# Patient Record
Sex: MALE | Race: WHITE | Employment: UNEMPLOYED | URBAN - METROPOLITAN AREA
[De-identification: names, ages, dates, MRNs, and addresses within clinical notes are randomized per-mention and may not be internally consistent; named-entity substitution may affect disease eponyms.]

---
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
## 2023-08-02 ENCOUNTER — TELEPHONE (OUTPATIENT)
Dept: FAMILY MEDICINE | Facility: CLINIC | Age: 18
End: 2023-08-02

## 2023-08-02 NOTE — TELEPHONE ENCOUNTER
The mother calls to ask for labs to be placed to check his blood because he is a vegetarian. This was discussed at the last visit but no labs were placed. The last time the patient had blood work he passed out and ended up in the ER and then to see cardiology.  The mother would like labs placed.  No consent has been signed please contact the patient after the provider has reviewed the information.        Thank you    Ale MARTÍNEZ RN        (An encounter was started in the wrong patient-  this is the information from the other chart-  about 6 months ago tried to do a lab draw to check levels since he is a vegetarian. He passed out during that appt and couldn't wake up for 4hrs, ended up in the ED and heart clinic while drawing blood. So pt's mom is wanting to make sure that all bloodwork is included to check his levels so this doesn't happen again. Mentioned for sure iron and thyroid need to be added but if anything else is needed would like to include it. Last visit might've been with Ale Nova 11/25/23, Pt's mom is unsure.)

## 2023-08-17 NOTE — PROGRESS NOTES
Raji Curtis is on our lab schedule for Monday 08/21. He doesn't have any orders. I reached out to Jessica Angel. She said you were the last to see him in 2022. Please advise.  Thank you,  [facility]

## 2023-08-17 NOTE — PROGRESS NOTES
Patient needs to be seen by a provider  His mother called and requested labs on a telephone call - see note dated 7/8/23  He last saw Ale Nova on 11/25/2022  I haven't seen patient since 7/2020    Jessica Angel, CNP

## 2023-08-17 NOTE — PROGRESS NOTES
I  called the patient and told him he would need to be seen by  in office before she can order any lab work.

## 2023-08-17 NOTE — PROGRESS NOTES
Raji Curtis has a lab appointment with us 08/21. There isn't any orders for him. His appointment notes say for Iron and Thyroid labs. Please advise.  Thank you,  [facility]

## 2023-08-21 ENCOUNTER — VIRTUAL VISIT (OUTPATIENT)
Dept: FAMILY MEDICINE | Facility: CLINIC | Age: 18
End: 2023-08-21
Payer: COMMERCIAL

## 2023-08-21 DIAGNOSIS — Z78.9 VEGETARIAN: Primary | ICD-10-CM

## 2023-08-21 DIAGNOSIS — I47.10 SVT (SUPRAVENTRICULAR TACHYCARDIA) (H): ICD-10-CM

## 2023-08-21 PROCEDURE — 99213 OFFICE O/P EST LOW 20 MIN: CPT | Mod: VID | Performed by: NURSE PRACTITIONER

## 2023-08-21 NOTE — PROGRESS NOTES
Raji is a 18 year old who is being evaluated via a billable video visit.      How would you like to obtain your AVS? Mail a copy  If the video visit is dropped, the invitation should be resent by: Send to e-mail at: [email or web address]  Will anyone else be joining your video visit? No          Assessment & Plan     Vegetarian  Labs ordered:  - Folate; Future  - Vitamin B12; Future  - Iron; Future    SVT (supraventricular tachycardia) (H)  Known issue that I take into account for their medical decisions, no current exacerbations or new concerns      The risks, benefits and treatment options of prescribed medications or other treatments have been discussed with the patient. The patient verbalized their understanding and should call or follow up if no improvement or if they develop further problems.  KEVIN Perdomo [facility]   Raji is a 18 year old, presenting for the following health issues:  No chief complaint on file.        8/21/2023    11:09 AM   Additional Questions   Roomed by Gina Hopper CMA       HPI     Chief Complaint   Patient presents with    wanting labs     He is wanting labs done. He did not tell me which labs but he was told he needed a visit before getting these done.    Patient is a vegetarian - wants to make sure he isn't deficient in anything.            Review of Systems   Constitutional, HEENT, cardiovascular, pulmonary, gi and gu systems are negative, except as otherwise noted.      Objective           Vitals:  No vitals were obtained today due to virtual visit.    Physical Exam   GENERAL: Healthy, alert and no distress  EYES: Eyes grossly normal to inspection.  No discharge or erythema, or obvious scleral/conjunctival abnormalities.  RESP: No audible wheeze, cough, or visible cyanosis.  No visible retractions or increased work of breathing.    SKIN: Visible skin clear. No significant rash, abnormal pigmentation or lesions.  NEURO:  Cranial nerves grossly intact.  Mentation and speech appropriate for age.  PSYCH: Mentation appears normal, affect normal/bright, judgement and insight intact, normal speech and appearance well-groomed.                Video-Visit Details    Type of service:  Video Visit     Originating Location (pt. Location): Home    Distant Location (provider location):  On-site  Platform used for Video Visit: Mariely

## 2023-08-23 ENCOUNTER — LAB (OUTPATIENT)
Dept: LAB | Facility: CLINIC | Age: 18
End: 2023-08-23
Payer: COMMERCIAL

## 2023-08-23 DIAGNOSIS — Z78.9 VEGETARIAN: ICD-10-CM

## 2023-08-23 LAB
FOLATE SERPL-MCNC: 17.6 NG/ML (ref 4.6–34.8)
IRON SERPL-MCNC: 159 UG/DL (ref 61–157)
VIT B12 SERPL-MCNC: 1027 PG/ML (ref 232–1245)

## 2023-08-23 PROCEDURE — 82746 ASSAY OF FOLIC ACID SERUM: CPT

## 2023-08-23 PROCEDURE — 82607 VITAMIN B-12: CPT

## 2023-08-23 PROCEDURE — 36415 COLL VENOUS BLD VENIPUNCTURE: CPT

## 2023-08-23 PROCEDURE — 83540 ASSAY OF IRON: CPT

## 2023-10-26 ENCOUNTER — PATIENT OUTREACH (OUTPATIENT)
Dept: CARE COORDINATION | Facility: CLINIC | Age: 18
End: 2023-10-26
Payer: COMMERCIAL

## 2023-11-09 ENCOUNTER — PATIENT OUTREACH (OUTPATIENT)
Dept: CARE COORDINATION | Facility: CLINIC | Age: 18
End: 2023-11-09
Payer: COMMERCIAL

## 2024-02-03 ENCOUNTER — HEALTH MAINTENANCE LETTER (OUTPATIENT)
Age: 19
End: 2024-02-03

## 2024-05-23 ENCOUNTER — PATIENT OUTREACH (OUTPATIENT)
Dept: CARE COORDINATION | Facility: CLINIC | Age: 19
End: 2024-05-23
Payer: COMMERCIAL

## 2025-03-02 ENCOUNTER — HEALTH MAINTENANCE LETTER (OUTPATIENT)
Age: 20
End: 2025-03-02

## (undated) RX ORDER — PROPOFOL 10 MG/ML
INJECTION, EMULSION INTRAVENOUS
Status: DISPENSED
Start: 2017-12-18

## (undated) RX ORDER — ADENOSINE 3 MG/ML
INJECTION, SOLUTION INTRAVENOUS
Status: DISPENSED
Start: 2017-12-18

## (undated) RX ORDER — BUPIVACAINE HYDROCHLORIDE 2.5 MG/ML
INJECTION, SOLUTION EPIDURAL; INFILTRATION; INTRACAUDAL
Status: DISPENSED
Start: 2017-12-18

## (undated) RX ORDER — LIDOCAINE HYDROCHLORIDE 10 MG/ML
INJECTION, SOLUTION EPIDURAL; INFILTRATION; INTRACAUDAL; PERINEURAL
Status: DISPENSED
Start: 2017-12-18

## (undated) RX ORDER — FENTANYL CITRATE 50 UG/ML
INJECTION, SOLUTION INTRAMUSCULAR; INTRAVENOUS
Status: DISPENSED
Start: 2017-12-18

## (undated) RX ORDER — HEPARIN SODIUM 1000 [USP'U]/ML
INJECTION, SOLUTION INTRAVENOUS; SUBCUTANEOUS
Status: DISPENSED
Start: 2017-12-18

## (undated) RX ORDER — IODIXANOL 320 MG/ML
INJECTION, SOLUTION INTRAVASCULAR
Status: DISPENSED
Start: 2017-12-18